# Patient Record
Sex: FEMALE | Race: WHITE
[De-identification: names, ages, dates, MRNs, and addresses within clinical notes are randomized per-mention and may not be internally consistent; named-entity substitution may affect disease eponyms.]

---

## 2019-04-06 ENCOUNTER — HOSPITAL ENCOUNTER (EMERGENCY)
Dept: HOSPITAL 10 - FTE | Age: 59
Discharge: HOME | End: 2019-04-06
Payer: MEDICAID

## 2019-04-06 ENCOUNTER — HOSPITAL ENCOUNTER (EMERGENCY)
Dept: HOSPITAL 91 - FTE | Age: 59
Discharge: HOME | End: 2019-04-06
Payer: MEDICAID

## 2019-04-06 VITALS — WEIGHT: 169.76 LBS | BODY MASS INDEX: 39.29 KG/M2 | HEIGHT: 55 IN

## 2019-04-06 VITALS — RESPIRATION RATE: 18 BRPM | HEART RATE: 88 BPM | DIASTOLIC BLOOD PRESSURE: 78 MMHG | SYSTOLIC BLOOD PRESSURE: 160 MMHG

## 2019-04-06 DIAGNOSIS — N30.00: Primary | ICD-10-CM

## 2019-04-06 LAB
ADD UMIC: YES
UR ASCORBIC ACID: NEGATIVE MG/DL
UR BILIRUBIN (DIP): NEGATIVE MG/DL
UR BLOOD (DIP): (no result) MG/DL
UR CLARITY: CLEAR
UR COLOR: YELLOW
UR GLUCOSE (DIP): NEGATIVE MG/DL
UR KETONES (DIP): NEGATIVE MG/DL
UR LEUKOCYTE ESTERASE (DIP): (no result) LEU/UL
UR NITRITE (DIP): NEGATIVE MG/DL
UR PH (DIP): 6 (ref 5–9)
UR RBC: 2 /HPF (ref 0–5)
UR SPECIFIC GRAVITY (DIP): 1.02 (ref 1–1.03)
UR TOTAL PROTEIN (DIP): NEGATIVE MG/DL
UR UROBILINOGEN (DIP): NEGATIVE MG/DL
UR WBC: 6 /HPF (ref 0–5)

## 2019-04-06 PROCEDURE — 70450 CT HEAD/BRAIN W/O DYE: CPT

## 2019-04-06 PROCEDURE — 99284 EMERGENCY DEPT VISIT MOD MDM: CPT

## 2019-04-06 PROCEDURE — 81001 URINALYSIS AUTO W/SCOPE: CPT

## 2019-04-06 RX ADMIN — ACETAMINOPHEN 1 MG: 325 TABLET, FILM COATED ORAL at 15:04

## 2019-04-06 NOTE — ERD
ER Documentation


Chief Complaint


Chief Complaint





ha x 4 years





HPI


58-year-old female presents with a headache which she says is her entire head 


for the last 4 years.  She has not been seen for this.  She denies any fevers, 


visual changes, history of trauma, deficits.  She denies urinary complaints, 


abdominal pain, breath or chest pain.





ROS


All systems reviewed and are negative except as per history of present illness.





Medications


Home Meds


Active Scripts


Cephalexin* (Keflex*) 500 Mg Capsule, 500 MG PO QID for 5 Days, CAP


   Prov:ANA BECKWITH MD         4/6/19


Ibuprofen* (Motrin*) 600 Mg Tab, 600 MG PO Q6, #30 TAB


   Prov:ANA BECKWITH MD         4/6/19





PMhx/Soc


Hx Alcohol Use:  No


Hx Substance Use:  No


Hx Tobacco Use:  No





FmHx


Family History:  No diabetes, No coronary disease, No other





Physical Exam


Vitals





Vital Signs


  Date      Temp  Pulse  Resp  B/P (MAP)   Pulse Ox  O2          O2 Flow    FiO2


Time                                                 Delivery    Rate


    4/6/19  98.6     88    18      160/78        99


     13:20                          (105)





Physical Exam


Const:   No acute distress


Head:   Atraumatic 


Eyes:    Normal Conjunctiva.  Eyes Izabella and extraocular movements intact.


ENT:    Normal External Ears, Nose and Mouth.  TMs and oropharynx normal.


Neck:               Full range of motion. No meningismus.


Resp:   Clear to auscultation bilaterally


Cardio:   Regular rate and rhythm, no murmurs


Abd:    Soft, non tender, non distended. Normal bowel sounds


Skin:   No petechiae or rashes


Back:   No midline or flank tenderness


Ext:    No cyanosis, or edema


Neur:   Awake and alert normal gait.  Cranial nerves II through XII grossly 


intact.  No cerebellar signs.  Negative Romberg no pronator drift.


Psych:    Normal Mood and Affect


Results 24 hrs





Laboratory Tests


                   Test
                        4/6/19
15:06


                   Urine Color               YELLOW


                   Urine Clarity             CLEAR


                   Urine pH                             6.0


                   Urine Specific Gravity             1.018


                   Urine Ketones             NEGATIVE mg/dL


                   Urine Nitrite             NEGATIVE mg/dL


                   Urine Bilirubin           NEGATIVE mg/dL


                   Urine Urobilinogen        NEGATIVE mg/dL


                   Urine Leukocyte Esterase       2+ Katherine/ul


                   Urine Microscopic RBC             2 /HPF


                   Urine Microscopic WBC             6 /HPF


                   Urine Hemoglobin                1+ mg/dL


                   Urine Glucose             NEGATIVE mg/dL


                   Urine Total Protein       NEGATIVE mg/dl





Current Medications


 Medications
   Dose
          Sig/Yuni
       Start Time
   Status  Last


 (Trade)       Ordered        Route
 PRN     Stop Time              Admin
Dose


                              Reason                                Admin


                650 mg         ONCE  ONCE
    4/6/19        DC            4/6/19


Acetaminophen                 PO
            15:00
 4/6/19                15:04




  (Tylenol                                  15:01


Tab)








Procedures/MDM


Shows leukocyte esterase and white blood cells.  Patient given the chronicity of


headache CT brain was performed which shows no acute abnormalities.  She has 


empty sella.  She was given Tylenol for pain.  Patient was treated with Keflex 


and ibuprofen and primary care follow-up.  She has no signs of neurologic 


deficit, meningitis, additional emergent signs or symptoms of presenting 


complaints.  The patient was stable with no new complaints during the ER course.


Clinically, there is no current evidence to suggest meningitis, sepsis, acute 


abdomen, pneumonia, stroke,  acute coronary syndrome, pulmonary embolism, aortic


dissection or any other emergent condition appearing to require further 


evaluation or hospitalization.  Patient counseled regarding my diagnostic 


impression and care plan. Prior to discharge all questions answered. Pt agrees 


with treatment plan and understands strict return precautions. Pt is instructed 


to follow up with primary care provider within 24-48 hours. Precautionary 


instructions provided including instructions to return to the ER if not 


improving or for any worsening or changing symptoms or concerns.





Departure


Diagnosis:  


   Primary Impression:  


   UTI (urinary tract infection)


   Urinary tract infection type:  acute cystitis  Hematuria presence:  without 


   hematuria  Qualified Codes:  N30.00 - Acute cystitis without hematuria


   Additional Impression:  


   Headache


   Headache type:  unspecified  Headache chronicity pattern:  unspecified 


   pattern  Intractability:  not intractable  Qualified Codes:  R51 - Headache


Condition:  Stable


Patient Instructions:  Understanding Urinary Tract Infections (UTIs), Self-Care 


for Headaches


Referrals:  


[facility]


Usted se ha hecho un examen mdico de control que le indica que no est en oneal 


condicin que requiera tratamiento urgente en el Departamento de Emergencia. Un 


estudio ms profundo y el tratamiento de hall condicin pueden esperar sin ningn 


riesgo hasta que usted sea atendida/o en el consultorio de hall mdico o oneal 


clnica. Es responsabilidad suya arreglar oneal ricki para el seguimiento del priscilla.








MANEJO DE CONDICIONES NO URGENTES EN EL FUTURO


1) Si usted tiene un mdico de atencin primaria:





Usted debera llamar a hall mdico de atencin primaria antes de venir al 


departamento de emergencia. Despus de las horas de consultorio, hall doctor o hall 


asociado/a est disponible por telfono. El mdico o enfermero de brenda en el 


servicio telefnico puede asesorarle por jony medio para atender el problema, o 


priscilla contrario se puede programar oneal ricki.





2) Si usted no tiene un mdico de atencin primaria:


Llame al mdico o clnica de referencia que aparece abajo matt las horas de 


consultorio para hacer oneal ricki para que le vean.





CLINICAS:


[facility]  [phone] [address] [facility]  [phone] [address] [facility] [phone] [address] [facility]  [phone] [address] [place] [phone] [address] [facility]. [phone] 


[address]





Additional Instructions:  


hay infeccion en orina y vamos a tratar. CT normal.  Cheque otro vez con hall 


doctor primario en el proximo darling or regresa para mas o nueva simptomas.











ANA BECKWITH MD              Apr 6, 2019 17:05